# Patient Record
Sex: FEMALE
[De-identification: names, ages, dates, MRNs, and addresses within clinical notes are randomized per-mention and may not be internally consistent; named-entity substitution may affect disease eponyms.]

---

## 2021-04-05 ENCOUNTER — TRANSCRIPTION ENCOUNTER (OUTPATIENT)
Age: 31
End: 2021-04-05

## 2021-11-21 PROBLEM — Z00.00 ENCOUNTER FOR PREVENTIVE HEALTH EXAMINATION: Status: ACTIVE | Noted: 2021-11-21

## 2021-12-14 ENCOUNTER — ASOB RESULT (OUTPATIENT)
Age: 31
End: 2021-12-14

## 2021-12-14 ENCOUNTER — APPOINTMENT (OUTPATIENT)
Dept: ANTEPARTUM | Facility: CLINIC | Age: 31
End: 2021-12-14
Payer: COMMERCIAL

## 2021-12-14 PROCEDURE — 76811 OB US DETAILED SNGL FETUS: CPT

## 2022-02-23 ENCOUNTER — ASOB RESULT (OUTPATIENT)
Age: 32
End: 2022-02-23

## 2022-02-23 ENCOUNTER — APPOINTMENT (OUTPATIENT)
Dept: MATERNAL FETAL MEDICINE | Facility: CLINIC | Age: 32
End: 2022-02-23
Payer: COMMERCIAL

## 2022-02-23 ENCOUNTER — TRANSCRIPTION ENCOUNTER (OUTPATIENT)
Age: 32
End: 2022-02-23

## 2022-02-23 DIAGNOSIS — O24.419 GESTATIONAL DIABETES MELLITUS IN PREGNANCY, UNSPECIFIED CONTROL: ICD-10-CM

## 2022-02-23 PROCEDURE — G0109 DIAB MANAGE TRN IND/GROUP: CPT | Mod: 95

## 2022-02-23 RX ORDER — LANCETS 33 GAUGE
EACH MISCELLANEOUS
Qty: 4 | Refills: 2 | Status: ACTIVE | COMMUNITY
Start: 2022-02-23 | End: 1900-01-01

## 2022-02-23 RX ORDER — BLOOD-GLUCOSE METER
W/DEVICE KIT MISCELLANEOUS
Qty: 1 | Refills: 0 | Status: ACTIVE | COMMUNITY
Start: 2022-02-23 | End: 1900-01-01

## 2022-02-23 RX ORDER — URINE ACETONE TEST STRIPS
STRIP MISCELLANEOUS
Qty: 1 | Refills: 0 | Status: ACTIVE | COMMUNITY
Start: 2022-02-23 | End: 1900-01-01

## 2022-02-23 RX ORDER — BLOOD-GLUCOSE METER
KIT MISCELLANEOUS 4 TIMES DAILY
Qty: 4 | Refills: 1 | Status: ACTIVE | COMMUNITY
Start: 2022-02-23 | End: 1900-01-01

## 2022-03-08 ENCOUNTER — APPOINTMENT (OUTPATIENT)
Dept: MATERNAL FETAL MEDICINE | Facility: CLINIC | Age: 32
End: 2022-03-08
Payer: COMMERCIAL

## 2022-03-08 ENCOUNTER — ASOB RESULT (OUTPATIENT)
Age: 32
End: 2022-03-08

## 2022-03-08 PROCEDURE — G0108 DIAB MANAGE TRN  PER INDIV: CPT | Mod: 95

## 2022-03-09 ENCOUNTER — OUTPATIENT (OUTPATIENT)
Dept: OUTPATIENT SERVICES | Facility: HOSPITAL | Age: 32
LOS: 1 days | End: 2022-03-09
Payer: COMMERCIAL

## 2022-03-09 VITALS — HEART RATE: 72 BPM | SYSTOLIC BLOOD PRESSURE: 112 MMHG | DIASTOLIC BLOOD PRESSURE: 64 MMHG

## 2022-03-09 VITALS
RESPIRATION RATE: 18 BRPM | OXYGEN SATURATION: 87 % | HEART RATE: 115 BPM | DIASTOLIC BLOOD PRESSURE: 55 MMHG | TEMPERATURE: 99 F | SYSTOLIC BLOOD PRESSURE: 108 MMHG

## 2022-03-09 DIAGNOSIS — Z3A.00 WEEKS OF GESTATION OF PREGNANCY NOT SPECIFIED: ICD-10-CM

## 2022-03-09 DIAGNOSIS — O26.899 OTHER SPECIFIED PREGNANCY RELATED CONDITIONS, UNSPECIFIED TRIMESTER: ICD-10-CM

## 2022-03-09 LAB
APPEARANCE UR: CLEAR — SIGNIFICANT CHANGE UP
BILIRUB UR-MCNC: NEGATIVE — SIGNIFICANT CHANGE UP
COLOR SPEC: SIGNIFICANT CHANGE UP
DIFF PNL FLD: NEGATIVE — SIGNIFICANT CHANGE UP
GLUCOSE UR QL: NEGATIVE — SIGNIFICANT CHANGE UP
KETONES UR-MCNC: ABNORMAL
LEUKOCYTE ESTERASE UR-ACNC: NEGATIVE — SIGNIFICANT CHANGE UP
NITRITE UR-MCNC: NEGATIVE — SIGNIFICANT CHANGE UP
PH UR: 6.5 — SIGNIFICANT CHANGE UP (ref 5–8)
PROT UR-MCNC: NEGATIVE — SIGNIFICANT CHANGE UP
SP GR SPEC: 1.01 — SIGNIFICANT CHANGE UP (ref 1.01–1.02)
UROBILINOGEN FLD QL: NEGATIVE — SIGNIFICANT CHANGE UP

## 2022-03-09 PROCEDURE — 81003 URINALYSIS AUTO W/O SCOPE: CPT

## 2022-03-09 PROCEDURE — 59025 FETAL NON-STRESS TEST: CPT

## 2022-03-09 PROCEDURE — 87086 URINE CULTURE/COLONY COUNT: CPT

## 2022-03-09 PROCEDURE — G0463: CPT

## 2022-03-09 RX ORDER — SODIUM CHLORIDE 9 MG/ML
1000 INJECTION INTRAMUSCULAR; INTRAVENOUS; SUBCUTANEOUS ONCE
Refills: 0 | Status: COMPLETED | OUTPATIENT
Start: 2022-03-09 | End: 2022-03-09

## 2022-03-09 RX ADMIN — SODIUM CHLORIDE 1000 MILLILITER(S): 9 INJECTION INTRAMUSCULAR; INTRAVENOUS; SUBCUTANEOUS at 02:26

## 2022-03-09 RX ADMIN — Medication 0.25 MILLIGRAM(S): at 02:57

## 2022-03-09 RX ADMIN — Medication 0.25 MILLIGRAM(S): at 02:32

## 2022-03-09 NOTE — OB PROVIDER TRIAGE NOTE - NSOBPROVIDERNOTE_OBGYN_ALL_OB_FT
30yo  at 32.4wks presenting with PTC since 830pm. No evidence of PTL at this time as cervix is closed and uterine irritability on toco. Fetal status reassuring.     -Will send UA/Ucx to rule out UTI  -Terb 0.25mg q20min x2 doses for tocolysis   -IVF bolus  -Continue with EFM/Sonterra    D/w Dr. Rifkin RFrankel PGY3 32yo  at 32.4wks presenting with PTC since 830pm. No evidence of PTL at this time as cervix is closed and uterine irritability on toco. Fetal status reassuring.     -Will send UA/Ucx to rule out UTI  -Terb 0.25mg q20min x2 doses for tocolysis   -IVF bolus  -Continue with EFM/Simla    D/w Dr. Rifkin RFrankel PGY3    ----------  Patient reevaluated at bedside. UA negative. States she does not feel any contractions anymore after IVF and Terbutaline. Patient has follow up appointment with Dr. Khan today. Return precautions reviewed with patient and partner, including worsening contractions, LOF, VB, or dec FM. Patient expressed understanding.   Patient cleared for discharge.     D/w Dr. Rifkin RFrankel PGY3

## 2022-03-09 NOTE — OB PROVIDER TRIAGE NOTE - HISTORY OF PRESENT ILLNESS
32yo  at 32.4wk (KYLEE 2022) presenting with contractions since 830pm. Patient states she tried to lie down and walk around however contractions have intensified and increased in frequency to the point that now they are q1-2min apart. Endorses some associated low back. She denies VB, LOF, or dec FM. Denies nausea, vomiting, chest pain, SOB, dysuria, diarrhea, or discharge.     PNC: GDMA1  OBHx: current  GYNHx: Denies  PMH: Hypothyroidism   PSH: open appendectomy ()  Meds: Synthroid 50mcg qday, PNV, Calcium, Omega 3  All: NKDA  SocHx: Denies  FamHx: Father - DM

## 2022-03-09 NOTE — OB RN TRIAGE NOTE - FALL HARM RISK - UNIVERSAL INTERVENTIONS
Bed in lowest position, wheels locked, appropriate side rails in place/Call bell, personal items and telephone in reach/Instruct patient to call for assistance before getting out of bed or chair/Non-slip footwear when patient is out of bed/[address] to call system/Physically safe environment - no spills, clutter or unnecessary equipment/Purposeful Proactive Rounding/Room/bathroom lighting operational, light cord in reach

## 2022-03-09 NOTE — OB PROVIDER TRIAGE NOTE - NSHPPHYSICALEXAM_GEN_ALL_CORE
Vital Signs Last 24 Hrs  T(C): 37.1 (09 Mar 2022 01:28), Max: 37.1 (09 Mar 2022 01:28)  T(F): 98.8 (09 Mar 2022 01:28), Max: 98.8 (09 Mar 2022 01:28)  HR: 73 (09 Mar 2022 02:03) (67 - 92)  BP: 112/64 (09 Mar 2022 01:28) (112/64 - 112/64)  BP(mean): --  RR: 18 (09 Mar 2022 01:28) (18 - 18)  SpO2: 93% (09 Mar 2022 02:03) (85% - 100%)    Gen: resting comfortably, appears uncomfortable during cx episodes, last 10s  Abd: soft, NT, gravid  Back: no CVA tenderness bilat  SVE: 0/0/-3  TAUS: Vtx, post, EDWIN 13, 8/8  EFM: 140bpm, mod ana, +accels, -decels  [place]: uterine irritability

## 2022-03-10 LAB
CULTURE RESULTS: SIGNIFICANT CHANGE UP
SPECIMEN SOURCE: SIGNIFICANT CHANGE UP

## 2022-03-22 ENCOUNTER — APPOINTMENT (OUTPATIENT)
Dept: MATERNAL FETAL MEDICINE | Facility: CLINIC | Age: 32
End: 2022-03-22
Payer: COMMERCIAL

## 2022-03-22 ENCOUNTER — ASOB RESULT (OUTPATIENT)
Age: 32
End: 2022-03-22

## 2022-03-22 PROBLEM — E03.9 HYPOTHYROIDISM, UNSPECIFIED: Chronic | Status: ACTIVE | Noted: 2022-03-09

## 2022-03-22 PROBLEM — Z90.49 ACQUIRED ABSENCE OF OTHER SPECIFIED PARTS OF DIGESTIVE TRACT: Chronic | Status: ACTIVE | Noted: 2022-03-09

## 2022-03-22 PROCEDURE — G0108 DIAB MANAGE TRN  PER INDIV: CPT | Mod: 95

## 2022-04-05 ENCOUNTER — ASOB RESULT (OUTPATIENT)
Age: 32
End: 2022-04-05

## 2022-04-05 ENCOUNTER — APPOINTMENT (OUTPATIENT)
Dept: MATERNAL FETAL MEDICINE | Facility: CLINIC | Age: 32
End: 2022-04-05
Payer: COMMERCIAL

## 2022-04-05 PROCEDURE — G0108 DIAB MANAGE TRN  PER INDIV: CPT | Mod: 95

## 2022-05-01 ENCOUNTER — INPATIENT (INPATIENT)
Facility: HOSPITAL | Age: 32
LOS: 2 days | Discharge: HOME | End: 2022-05-04
Attending: STUDENT IN AN ORGANIZED HEALTH CARE EDUCATION/TRAINING PROGRAM | Admitting: STUDENT IN AN ORGANIZED HEALTH CARE EDUCATION/TRAINING PROGRAM
Payer: COMMERCIAL

## 2022-05-01 VITALS — OXYGEN SATURATION: 100 %

## 2022-05-01 DIAGNOSIS — Z34.80 ENCOUNTER FOR SUPERVISION OF OTHER NORMAL PREGNANCY, UNSPECIFIED TRIMESTER: ICD-10-CM

## 2022-05-01 DIAGNOSIS — Z3A.00 WEEKS OF GESTATION OF PREGNANCY NOT SPECIFIED: ICD-10-CM

## 2022-05-01 DIAGNOSIS — O26.899 OTHER SPECIFIED PREGNANCY RELATED CONDITIONS, UNSPECIFIED TRIMESTER: ICD-10-CM

## 2022-05-01 LAB
BASOPHILS # BLD AUTO: 0.05 K/UL — SIGNIFICANT CHANGE UP (ref 0–0.2)
BASOPHILS NFR BLD AUTO: 0.3 % — SIGNIFICANT CHANGE UP (ref 0–2)
BLD GP AB SCN SERPL QL: NEGATIVE — SIGNIFICANT CHANGE UP
COVID-19 SPIKE DOMAIN AB INTERP: POSITIVE
COVID-19 SPIKE DOMAIN ANTIBODY RESULT: >250 U/ML — HIGH
EOSINOPHIL # BLD AUTO: 0.06 K/UL — SIGNIFICANT CHANGE UP (ref 0–0.5)
EOSINOPHIL NFR BLD AUTO: 0.4 % — SIGNIFICANT CHANGE UP (ref 0–6)
GLUCOSE BLDC GLUCOMTR-MCNC: 100 MG/DL — HIGH (ref 70–99)
GLUCOSE BLDC GLUCOMTR-MCNC: 79 MG/DL — SIGNIFICANT CHANGE UP (ref 70–99)
GLUCOSE BLDC GLUCOMTR-MCNC: 97 MG/DL — SIGNIFICANT CHANGE UP (ref 70–99)
HCT VFR BLD CALC: 42.5 % — SIGNIFICANT CHANGE UP (ref 34.5–45)
HGB BLD-MCNC: 14.2 G/DL — SIGNIFICANT CHANGE UP (ref 11.5–15.5)
IMM GRANULOCYTES NFR BLD AUTO: 1.9 % — HIGH (ref 0–1.5)
LYMPHOCYTES # BLD AUTO: 14.4 % — SIGNIFICANT CHANGE UP (ref 13–44)
LYMPHOCYTES # BLD AUTO: 2.25 K/UL — SIGNIFICANT CHANGE UP (ref 1–3.3)
MCHC RBC-ENTMCNC: 29.6 PG — SIGNIFICANT CHANGE UP (ref 27–34)
MCHC RBC-ENTMCNC: 33.4 GM/DL — SIGNIFICANT CHANGE UP (ref 32–36)
MCV RBC AUTO: 88.5 FL — SIGNIFICANT CHANGE UP (ref 80–100)
MONOCYTES # BLD AUTO: 1.14 K/UL — HIGH (ref 0–0.9)
MONOCYTES NFR BLD AUTO: 7.3 % — SIGNIFICANT CHANGE UP (ref 2–14)
NEUTROPHILS # BLD AUTO: 11.84 K/UL — HIGH (ref 1.8–7.4)
NEUTROPHILS NFR BLD AUTO: 75.7 % — SIGNIFICANT CHANGE UP (ref 43–77)
NRBC # BLD: 0 /100 WBCS — SIGNIFICANT CHANGE UP (ref 0–0)
PLATELET # BLD AUTO: 166 K/UL — SIGNIFICANT CHANGE UP (ref 150–400)
RBC # BLD: 4.8 M/UL — SIGNIFICANT CHANGE UP (ref 3.8–5.2)
RBC # FLD: 14.6 % — HIGH (ref 10.3–14.5)
RH IG SCN BLD-IMP: POSITIVE — SIGNIFICANT CHANGE UP
RH IG SCN BLD-IMP: POSITIVE — SIGNIFICANT CHANGE UP
SARS-COV-2 IGG+IGM SERPL QL IA: >250 U/ML — HIGH
SARS-COV-2 IGG+IGM SERPL QL IA: POSITIVE
SARS-COV-2 RNA SPEC QL NAA+PROBE: SIGNIFICANT CHANGE UP
WBC # BLD: 15.63 K/UL — HIGH (ref 3.8–10.5)
WBC # FLD AUTO: 15.63 K/UL — HIGH (ref 3.8–10.5)

## 2022-05-01 RX ORDER — OXYTOCIN 10 UNIT/ML
333.33 VIAL (ML) INJECTION
Qty: 20 | Refills: 0 | Status: DISCONTINUED | OUTPATIENT
Start: 2022-05-01 | End: 2022-05-02

## 2022-05-01 RX ORDER — SODIUM CHLORIDE 9 MG/ML
1000 INJECTION, SOLUTION INTRAVENOUS
Refills: 0 | Status: DISCONTINUED | OUTPATIENT
Start: 2022-05-01 | End: 2022-05-02

## 2022-05-01 RX ORDER — OXYTOCIN 10 UNIT/ML
1 VIAL (ML) INJECTION
Qty: 30 | Refills: 0 | Status: DISCONTINUED | OUTPATIENT
Start: 2022-05-01 | End: 2022-05-04

## 2022-05-01 RX ORDER — SODIUM CHLORIDE 9 MG/ML
1000 INJECTION INTRAMUSCULAR; INTRAVENOUS; SUBCUTANEOUS
Refills: 0 | Status: DISCONTINUED | OUTPATIENT
Start: 2022-05-01 | End: 2022-05-02

## 2022-05-01 RX ORDER — OXYTOCIN 10 UNIT/ML
4 VIAL (ML) INJECTION
Qty: 30 | Refills: 0 | Status: DISCONTINUED | OUTPATIENT
Start: 2022-05-01 | End: 2022-05-01

## 2022-05-01 RX ORDER — SODIUM CHLORIDE 9 MG/ML
500 INJECTION INTRAMUSCULAR; INTRAVENOUS; SUBCUTANEOUS ONCE
Refills: 0 | Status: COMPLETED | OUTPATIENT
Start: 2022-05-01 | End: 2022-05-01

## 2022-05-01 RX ORDER — CITRIC ACID/SODIUM CITRATE 300-500 MG
15 SOLUTION, ORAL ORAL EVERY 6 HOURS
Refills: 0 | Status: DISCONTINUED | OUTPATIENT
Start: 2022-05-01 | End: 2022-05-02

## 2022-05-01 RX ORDER — SODIUM CHLORIDE 9 MG/ML
300 INJECTION INTRAMUSCULAR; INTRAVENOUS; SUBCUTANEOUS ONCE
Refills: 0 | Status: DISCONTINUED | OUTPATIENT
Start: 2022-05-01 | End: 2022-05-02

## 2022-05-01 RX ADMIN — Medication 4 MILLIUNIT(S)/MIN: at 19:04

## 2022-05-01 RX ADMIN — SODIUM CHLORIDE 1000 MILLILITER(S): 9 INJECTION INTRAMUSCULAR; INTRAVENOUS; SUBCUTANEOUS at 16:45

## 2022-05-01 RX ADMIN — SODIUM CHLORIDE 125 MILLILITER(S): 9 INJECTION, SOLUTION INTRAVENOUS at 09:09

## 2022-05-01 RX ADMIN — Medication 0.25 MILLIGRAM(S): at 16:45

## 2022-05-01 NOTE — OB PROVIDER H&P - ATTENDING COMMENTS
31y  @40w1d p/w contractions and admitted with newly diagnosed Oligo EDWIN 1.  GDMA1.  GBS neg    admit  labs   consents  for po cytotec and cook ballon  epidural prn    Yuly Khan  Ob attg

## 2022-05-01 NOTE — OB PROVIDER H&P - LIVING CHILDREN, OB PROFILE
YONAS MIRANDA   Fri May 5, 2017 10:20 AM  The following medication was given:     MEDICATION: Vitamin B12  1000mcg  ROUTE: IM  SITE: Deltoid - Right  DOSE: 1ml  LOT #: [id number]  :  [employer]  EXPIRATION DATE:  07/2018  NDC#: 0070-8266-46         Okayed by Dr. Madera to give 2 days early.       Yonas Miranda MA      0

## 2022-05-01 NOTE — OB PROVIDER H&P - HISTORY OF PRESENT ILLNESS
30y/o  at 40w1d presents with contractions Q5min since 1am. Pt reports 8/10 pain with CTX. Denies LOF, VB. +FM  PNC c/b GDMA1  EFW 3300  GBS Neg    OBHx: P0  GynHx: denies  PMHx: hypothyroid  PSHx: open appendectomy   Meds: PNV, synthroid  All: NKDA  SH: neg x3

## 2022-05-01 NOTE — OB PROVIDER IHI INDUCTION/AUGMENTATION NOTE - NS_CHECKALL_OBGYN_ALL_OB
Order was written/H&P was completed/Contractions pattern was reviewed/FHR was reviewed/Induction / Augmentation was discussed Have You Had Botox Before?: has had botox Additional History: Patient is afebrile at presentation and denies any symptoms consistent with Covid-19 infection. He/she has no sick contacts and has had no recent travel. When Was Your Last Botox Treatment?: 07/08/2020

## 2022-05-01 NOTE — OB PROVIDER H&P - NSICDXPASTMEDICALHX_GEN_ALL_CORE_FT
Pt complains of cough, shortness of breath, and chills x 3 days after being exposed to cousins that tested positive for covid. She lost taste and smell today. COVID  PUI   PAST MEDICAL HISTORY:  History of appendectomy 11 years ago    Hypothyroid on levothyroixine

## 2022-05-01 NOTE — OB PROVIDER TRIAGE NOTE - HISTORY OF PRESENT ILLNESS
32y/o  at 40w1d presents with contractions Q5min since 1am. Pt reports 8/10 pain with CTX. Denies LOF, VB. +FM  PNC c/b GDMA1  EFW 3300    OBHx: P0  GynHx: denies  PMHx: hypothyroid  PSHx: open appendectomy   Meds: PNV, synthroid  All: NKDA  SH: neg x3

## 2022-05-01 NOTE — OB PROVIDER H&P - NSHPPHYSICALEXAM_GEN_ALL_CORE
GA: NAD  Cards: RRR  Pulm: CTAB  Abd: soft, gravid, nontender  LE: nontender    VE: 0/50/-3  TAUS: vtx, MVP 5.0 GA: NAD  Cards: RRR  Pulm: CTAB  Abd: soft, gravid, nontender  LE: nontender    VE: 0/50/-3  TAUS: vtx, EDWIN 1.0

## 2022-05-01 NOTE — OB PROVIDER H&P - ASSESSMENT
31y  @40w1d p/w painful contractions and admitted with rrIOL with questionable decrease in EDWIN (MVP 5.0).  GBS neg.    Plan:  -admit to L&D  -routine labs  -IVF  -4BC  -epi/CB after doses of BC    d/w Dr. Bill Bhardwaj, PGY3 31y  @40w1d p/w painful contractions and admitted with newly diagnosed Oligo EDWIN 1.  GDMA1.  GBS neg.    Plan:  -admit to L&D  -routine labs  -IVF  -4BC  -epi/CB after doses of BC    d/w Dr. Bill Bhardwaj, PGY3

## 2022-05-01 NOTE — OB PROVIDER TRIAGE NOTE - NSHPPHYSICALEXAM_GEN_ALL_CORE
Vital Signs Last 24 Hrs  T(C): 36.9 (01 May 2022 05:09), Max: 36.9 (01 May 2022 05:09)  T(F): 98.4 (01 May 2022 05:09), Max: 98.4 (01 May 2022 05:09)  HR: 64 (01 May 2022 05:27) (64 - 88)  BP: 137/77 (01 May 2022 05:09) (137/77 - 137/77)  BP(mean): --  RR: 16 (01 May 2022 05:09) (16 - 16)  SpO2: 98% (01 May 2022 05:27) (98% - 100%)    GA: NAD  Cards: RRR  Pulm: CTAB  Abd: soft, gravid, nontender  LE: nontender    VE: 0/50/-3  TAUS: vtx, MVP 5.0    EFM: 120/mod/+accels/-decels  [place]: irregular

## 2022-05-01 NOTE — PRE-ANESTHESIA EVALUATION ADULT - NSANTHPMHFT_GEN_ALL_CORE
No cardiac or pulmonary disease  No bleeding or clotting disorders  GDM - diet controlled  Hypothyroid - medically managed

## 2022-05-01 NOTE — OB PROVIDER TRIAGE NOTE - NSOBPROVIDERNOTE_OBGYN_ALL_OB_FT
32y/o  at 40w1d presents for r/o labor, likely in early latent labor, VE closed on exam.   -NST->home  -labor precautions reviewed with pt and partner      D/w Dr. Bill lovett pgy4 32y/o  at 40w1d presents for r/o labor, likely in early latent labor, VE closed on exam.   -NST with indeterminate baseline with periods or nonreactivity  -Continue to monitor, will reeval tracing in 30min-1hr      D/w Dr. Bill lovett pgy4

## 2022-05-01 NOTE — PRE-ANESTHESIA EVALUATION ADULT - NSANTHOSAYNRD_GEN_A_CORE
No. BA screening performed.  STOP BANG Legend: 0-2 = LOW Risk; 3-4 = INTERMEDIATE Risk; 5-8 = HIGH Risk

## 2022-05-02 LAB
GLUCOSE BLDC GLUCOMTR-MCNC: 107 MG/DL — HIGH (ref 70–99)
GLUCOSE BLDC GLUCOMTR-MCNC: 92 MG/DL — SIGNIFICANT CHANGE UP (ref 70–99)
T PALLIDUM AB TITR SER: NEGATIVE — SIGNIFICANT CHANGE UP

## 2022-05-02 RX ORDER — POLYETHYLENE GLYCOL 3350 17 G/17G
17 POWDER, FOR SOLUTION ORAL DAILY
Refills: 0 | Status: DISCONTINUED | OUTPATIENT
Start: 2022-05-02 | End: 2022-05-04

## 2022-05-02 RX ORDER — HYDROCORTISONE 1 %
1 OINTMENT (GRAM) TOPICAL EVERY 6 HOURS
Refills: 0 | Status: DISCONTINUED | OUTPATIENT
Start: 2022-05-02 | End: 2022-05-04

## 2022-05-02 RX ORDER — KETOROLAC TROMETHAMINE 30 MG/ML
30 SYRINGE (ML) INJECTION ONCE
Refills: 0 | Status: DISCONTINUED | OUTPATIENT
Start: 2022-05-02 | End: 2022-05-02

## 2022-05-02 RX ORDER — OXYTOCIN 10 UNIT/ML
333.33 VIAL (ML) INJECTION
Qty: 20 | Refills: 0 | Status: COMPLETED | OUTPATIENT
Start: 2022-05-02 | End: 2022-05-02

## 2022-05-02 RX ORDER — SODIUM CHLORIDE 9 MG/ML
3 INJECTION INTRAMUSCULAR; INTRAVENOUS; SUBCUTANEOUS EVERY 8 HOURS
Refills: 0 | Status: DISCONTINUED | OUTPATIENT
Start: 2022-05-02 | End: 2022-05-04

## 2022-05-02 RX ORDER — SENNA PLUS 8.6 MG/1
2 TABLET ORAL AT BEDTIME
Refills: 0 | Status: DISCONTINUED | OUTPATIENT
Start: 2022-05-02 | End: 2022-05-04

## 2022-05-02 RX ORDER — DIBUCAINE 1 %
1 OINTMENT (GRAM) RECTAL EVERY 6 HOURS
Refills: 0 | Status: DISCONTINUED | OUTPATIENT
Start: 2022-05-02 | End: 2022-05-04

## 2022-05-02 RX ORDER — IBUPROFEN 200 MG
600 TABLET ORAL EVERY 6 HOURS
Refills: 0 | Status: COMPLETED | OUTPATIENT
Start: 2022-05-02 | End: 2023-03-31

## 2022-05-02 RX ORDER — IBUPROFEN 200 MG
600 TABLET ORAL EVERY 6 HOURS
Refills: 0 | Status: DISCONTINUED | OUTPATIENT
Start: 2022-05-02 | End: 2022-05-04

## 2022-05-02 RX ORDER — BENZOCAINE 10 %
1 GEL (GRAM) MUCOUS MEMBRANE EVERY 6 HOURS
Refills: 0 | Status: DISCONTINUED | OUTPATIENT
Start: 2022-05-02 | End: 2022-05-04

## 2022-05-02 RX ORDER — OXYCODONE HYDROCHLORIDE 5 MG/1
5 TABLET ORAL ONCE
Refills: 0 | Status: DISCONTINUED | OUTPATIENT
Start: 2022-05-02 | End: 2022-05-04

## 2022-05-02 RX ORDER — ACETAMINOPHEN 500 MG
975 TABLET ORAL
Refills: 0 | Status: DISCONTINUED | OUTPATIENT
Start: 2022-05-02 | End: 2022-05-04

## 2022-05-02 RX ORDER — LANOLIN
1 OINTMENT (GRAM) TOPICAL EVERY 6 HOURS
Refills: 0 | Status: DISCONTINUED | OUTPATIENT
Start: 2022-05-02 | End: 2022-05-04

## 2022-05-02 RX ORDER — DIPHENHYDRAMINE HCL 50 MG
25 CAPSULE ORAL EVERY 6 HOURS
Refills: 0 | Status: DISCONTINUED | OUTPATIENT
Start: 2022-05-02 | End: 2022-05-04

## 2022-05-02 RX ORDER — PRAMOXINE HYDROCHLORIDE 150 MG/15G
1 AEROSOL, FOAM RECTAL EVERY 4 HOURS
Refills: 0 | Status: DISCONTINUED | OUTPATIENT
Start: 2022-05-02 | End: 2022-05-04

## 2022-05-02 RX ORDER — AER TRAVELER 0.5 G/1
1 SOLUTION RECTAL; TOPICAL EVERY 4 HOURS
Refills: 0 | Status: DISCONTINUED | OUTPATIENT
Start: 2022-05-02 | End: 2022-05-04

## 2022-05-02 RX ORDER — OXYCODONE HYDROCHLORIDE 5 MG/1
5 TABLET ORAL
Refills: 0 | Status: DISCONTINUED | OUTPATIENT
Start: 2022-05-02 | End: 2022-05-04

## 2022-05-02 RX ORDER — TETANUS TOXOID, REDUCED DIPHTHERIA TOXOID AND ACELLULAR PERTUSSIS VACCINE, ADSORBED 5; 2.5; 8; 8; 2.5 [IU]/.5ML; [IU]/.5ML; UG/.5ML; UG/.5ML; UG/.5ML
0.5 SUSPENSION INTRAMUSCULAR ONCE
Refills: 0 | Status: DISCONTINUED | OUTPATIENT
Start: 2022-05-02 | End: 2022-05-04

## 2022-05-02 RX ORDER — SIMETHICONE 80 MG/1
80 TABLET, CHEWABLE ORAL EVERY 4 HOURS
Refills: 0 | Status: DISCONTINUED | OUTPATIENT
Start: 2022-05-02 | End: 2022-05-04

## 2022-05-02 RX ORDER — MAGNESIUM HYDROXIDE 400 MG/1
30 TABLET, CHEWABLE ORAL
Refills: 0 | Status: DISCONTINUED | OUTPATIENT
Start: 2022-05-02 | End: 2022-05-04

## 2022-05-02 RX ADMIN — Medication 30 MILLIGRAM(S): at 05:03

## 2022-05-02 RX ADMIN — OXYCODONE HYDROCHLORIDE 5 MILLIGRAM(S): 5 TABLET ORAL at 13:30

## 2022-05-02 RX ADMIN — Medication 1 TABLET(S): at 15:25

## 2022-05-02 RX ADMIN — Medication 600 MILLIGRAM(S): at 19:40

## 2022-05-02 RX ADMIN — Medication 975 MILLIGRAM(S): at 09:19

## 2022-05-02 RX ADMIN — Medication 1000 MILLIUNIT(S)/MIN: at 09:15

## 2022-05-02 RX ADMIN — Medication 975 MILLIGRAM(S): at 22:00

## 2022-05-02 RX ADMIN — Medication 975 MILLIGRAM(S): at 16:00

## 2022-05-02 RX ADMIN — Medication 975 MILLIGRAM(S): at 15:24

## 2022-05-02 RX ADMIN — OXYCODONE HYDROCHLORIDE 5 MILLIGRAM(S): 5 TABLET ORAL at 13:02

## 2022-05-02 RX ADMIN — Medication 1000 MILLIUNIT(S)/MIN: at 05:28

## 2022-05-02 RX ADMIN — Medication 600 MILLIGRAM(S): at 19:01

## 2022-05-02 RX ADMIN — SENNA PLUS 2 TABLET(S): 8.6 TABLET ORAL at 19:02

## 2022-05-02 RX ADMIN — Medication 975 MILLIGRAM(S): at 21:13

## 2022-05-02 NOTE — OB PROVIDER LABOR PROGRESS NOTE - NS_OBIHICONTRACTIONPATTERNDETAILS_OBGYN_ALL_OB_FT
ctx q 2-3 mins
ctx q 5 mins
q2-4 minutes
q2-3 minutes
ctx q 3-4 mins
tx q 3-5 mins
irregular w/ tetanic ctx
q2-3 minutes

## 2022-05-02 NOTE — OB PROVIDER LABOR PROGRESS NOTE - NS_OBIHIFHRDETAILS_OBGYN_ALL_OB_FT
145 bpm, moderate variability, late decelerations- resolved with maternal position change
155bpm, +accels, no decels
cat I
145/mod/+accels/+late decels Cat II
130/mod/+accels/occ variables  Cat II
120, mod, +accels, +7min decel
175 bpm, no accels, variable and late decels
145/mod/+accels/+variables Cat II

## 2022-05-02 NOTE — OB PROVIDER DELIVERY SUMMARY - NSPROVIDERDELIVERYNOTE_OBGYN_ALL_OB_FT
VAVD of viable infant after four pulls and 2 pop-offs of vacuum and a RML episiotomy was cut. Head, shoulders and body delivered easily in OP position. Placenta delivered intact. Vaginal exam revealed intact cervix, vaginal walls, sulci. A third degree laceration was identified and repaired in usual fashion with 2.0 vicryl, 2.0 vicryl rapide and 3.0 vicryl rapide suture. The rectum was examined and noted to be intact, without any suture. Bimanual exam performed, with minimal clot evacuated. Fundus and lower uterine segment firm. Excellent hemostasis. Count was correct x2.    Taylor, PGY-3

## 2022-05-02 NOTE — OB PROVIDER LABOR PROGRESS NOTE - NSVAGINALEXAM_OBGYN_ALL_OB_DT
01-May-2022 16:52
02-May-2022 02:45
01-May-2022 20:09
02-May-2022 00:58
01-May-2022 18:43
01-May-2022 15:04
01-May-2022 21:24

## 2022-05-02 NOTE — OB RN DELIVERY SUMMARY - NSSELHIDDEN_OBGYN_ALL_OB_FT
[NS_DeliveryAttending1_OBGYN_ALL_OB_FT:[id number]],[NS_DeliveryRN_OBGYN_ALL_OB_FT:[id number]] [NS_DeliveryAttending1_OBGYN_ALL_OB_FT:[id number]=],[NS_DeliveryRN_OBGYN_ALL_OB_FT:[id number]=],[NS_DeliveryAssist1_OBGYN_ALL_OB_FT:[id number]=]

## 2022-05-02 NOTE — OB RN DELIVERY SUMMARY - NS_SEPSISRSKCALC_OBGYN_ALL_OB_FT
EOS calculated successfully. EOS Risk Factor: 0.5/1000 live births ([facility] national incidence); GA=40w2d; Temp=99.68; ROM=9; GBS='Negative'; Antibiotics='No antibiotics or any antibiotics < 2 hrs prior to birth'

## 2022-05-02 NOTE — OB NEONATOLOGY/PEDIATRICIAN DELIVERY SUMMARY - NSPEDSNEONOTESA_OBGYN_ALL_OB_FT
40+1 wk SGA male born via VAVD for NRFHT to a 32 y/o  mother.  Maternal history of hypothyroidism on synthroid. Prenatal history of GDMA1 and oligohydramnios. Maternal labs include Blood Type A+ , HIV - , RPR NR , Rubella I , Hep B - , GBS - 22, COVID -. AROM at 1834 with clear fluids (ROM hours: 9). Baby emerged vigorous, crying, was w/d/s/s with APGARS of 9/9. Mom plans to initiate breastfeeding, declines Hep B vaccine and declines circ.  Highest maternal temp: 37.6. EOS 0.34.

## 2022-05-02 NOTE — OB PROVIDER LABOR PROGRESS NOTE - NS_SUBJECTIVE/OBJECTIVE_OBGYN_ALL_OB_FT
patient is feeling contractions
Pt comfortable with epidural  Denies rectal pressure  Pitocin at 2mu
Pt examined for progress. Denies rectal pressure/urge to push   Pitocin at 2mu
pt is comfortable
Pt pushing with contractions x 2 hours with poor maternal effort  Now refusing to push   Pitocin off
patient examined at bedside for prolonged decel
pt is comfortable
pt is comfortable with epidural

## 2022-05-02 NOTE — OB PROVIDER LABOR PROGRESS NOTE - ASSESSMENT
30yo  at 40.2 weeks  Category I Tracing  second stage of labor    Plan  - labor down for 30 minutes  - initiate pushing with maternal urge  - anticipate  
cook balloon in place  reexamine prn    Yuly Khan  ob attg
arom no fluid  discussed with pt will start pitocin.    Yuly Khan  OB attg
30 yo  at 40.1 weeks, IOL for oligo  s/p PO Cytotec/CB   Cat II tracing s/p Terb x 1    Plan   - continue pitocin augmentation  - re-assess in 2 hours or sooner for fetal indication
CB removed  Terb x1 given  Patient repositioned  FH returned to baseline    Dr. Khan aware  De Bhardwaj, PGY3
30 yo  at 40.2 weeks  pushing x 2 hours with minimal progress  Cat II Tracing    Plan  - epidural top off  - MD Pate aware of pt status  - pt counseled on R/B of VAVD vs PCS  - will resume pushing when pt comfortable   - CNM remains at bedside 
unchanged exam  pitocin held  discussed with pt if late decelerations persist will need primary  delivery    Yuly Khan  Ob attg
IUPC placed  will start amnioinfusion    Yuly Khan  Ob attg

## 2022-05-03 ENCOUNTER — TRANSCRIPTION ENCOUNTER (OUTPATIENT)
Age: 32
End: 2022-05-03

## 2022-05-03 DIAGNOSIS — Z37.9 OUTCOME OF DELIVERY, UNSPECIFIED: ICD-10-CM

## 2022-05-03 LAB
BASOPHILS # BLD AUTO: 0.04 K/UL — SIGNIFICANT CHANGE UP (ref 0–0.2)
BASOPHILS NFR BLD AUTO: 0.2 % — SIGNIFICANT CHANGE UP (ref 0–2)
EOSINOPHIL # BLD AUTO: 0.19 K/UL — SIGNIFICANT CHANGE UP (ref 0–0.5)
EOSINOPHIL NFR BLD AUTO: 1 % — SIGNIFICANT CHANGE UP (ref 0–6)
HCT VFR BLD CALC: 25.4 % — LOW (ref 34.5–45)
HGB BLD-MCNC: 8.4 G/DL — LOW (ref 11.5–15.5)
IMM GRANULOCYTES NFR BLD AUTO: 2 % — HIGH (ref 0–1.5)
LYMPHOCYTES # BLD AUTO: 12.9 % — LOW (ref 13–44)
LYMPHOCYTES # BLD AUTO: 2.53 K/UL — SIGNIFICANT CHANGE UP (ref 1–3.3)
MCHC RBC-ENTMCNC: 30 PG — SIGNIFICANT CHANGE UP (ref 27–34)
MCHC RBC-ENTMCNC: 33.1 GM/DL — SIGNIFICANT CHANGE UP (ref 32–36)
MCV RBC AUTO: 90.7 FL — SIGNIFICANT CHANGE UP (ref 80–100)
MONOCYTES # BLD AUTO: 0.78 K/UL — SIGNIFICANT CHANGE UP (ref 0–0.9)
MONOCYTES NFR BLD AUTO: 4 % — SIGNIFICANT CHANGE UP (ref 2–14)
NEUTROPHILS # BLD AUTO: 15.63 K/UL — HIGH (ref 1.8–7.4)
NEUTROPHILS NFR BLD AUTO: 79.9 % — HIGH (ref 43–77)
NRBC # BLD: 0 /100 WBCS — SIGNIFICANT CHANGE UP (ref 0–0)
PLATELET # BLD AUTO: 126 K/UL — LOW (ref 150–400)
RBC # BLD: 2.8 M/UL — LOW (ref 3.8–5.2)
RBC # FLD: 15 % — HIGH (ref 10.3–14.5)
WBC # BLD: 19.57 K/UL — HIGH (ref 3.8–10.5)
WBC # FLD AUTO: 19.57 K/UL — HIGH (ref 3.8–10.5)

## 2022-05-03 RX ORDER — LEVOTHYROXINE SODIUM 125 MCG
50 TABLET ORAL DAILY
Refills: 0 | Status: DISCONTINUED | OUTPATIENT
Start: 2022-05-03 | End: 2022-05-04

## 2022-05-03 RX ADMIN — Medication 600 MILLIGRAM(S): at 19:00

## 2022-05-03 RX ADMIN — Medication 600 MILLIGRAM(S): at 06:02

## 2022-05-03 RX ADMIN — Medication 600 MILLIGRAM(S): at 18:29

## 2022-05-03 RX ADMIN — Medication 975 MILLIGRAM(S): at 21:15

## 2022-05-03 RX ADMIN — Medication 975 MILLIGRAM(S): at 03:06

## 2022-05-03 RX ADMIN — Medication 600 MILLIGRAM(S): at 06:32

## 2022-05-03 RX ADMIN — Medication 600 MILLIGRAM(S): at 01:05

## 2022-05-03 RX ADMIN — Medication 600 MILLIGRAM(S): at 02:27

## 2022-05-03 RX ADMIN — Medication 975 MILLIGRAM(S): at 09:11

## 2022-05-03 RX ADMIN — Medication 975 MILLIGRAM(S): at 15:28

## 2022-05-03 RX ADMIN — Medication 600 MILLIGRAM(S): at 13:25

## 2022-05-03 RX ADMIN — Medication 1 TABLET(S): at 12:52

## 2022-05-03 RX ADMIN — Medication 975 MILLIGRAM(S): at 04:40

## 2022-05-03 RX ADMIN — Medication 975 MILLIGRAM(S): at 16:00

## 2022-05-03 RX ADMIN — Medication 975 MILLIGRAM(S): at 09:41

## 2022-05-03 RX ADMIN — Medication 975 MILLIGRAM(S): at 20:41

## 2022-05-03 RX ADMIN — Medication 600 MILLIGRAM(S): at 12:53

## 2022-05-03 NOTE — DISCHARGE NOTE OB - PATIENT PORTAL LINK FT
You can access the FollowMyHealth Patient Portal offered by [facility] by registering at the following website: [email or web address]. By joining [employer]’s FollowMyHealth portal, you will also be able to view your health information using other applications (apps) compatible with our system.

## 2022-05-03 NOTE — DISCHARGE NOTE OB - MEDICATION SUMMARY - MEDICATIONS TO TAKE
I will START or STAY ON the medications listed below when I get home from the hospital:    acetaminophen 325 mg oral tablet  -- 3 tab(s) by mouth every 6 hours  -- Indication: For pain    ibuprofen 600 mg oral tablet  -- 1 tab(s) by mouth every 6 hours  -- Indication: For pain    levothyroxine 50 mcg (0.05 mg) oral tablet  -- 1 tab(s) by mouth once a day  -- Indication: For thyroid

## 2022-05-03 NOTE — PROGRESS NOTE ADULT - ATTENDING COMMENTS
Patient seen and examined.  Agree with above.    desires d/c tomorrow  low residue diet  ambulate  po pain meds    Yuly Khan MD  OB attg

## 2022-05-03 NOTE — DISCHARGE NOTE OB - NS MD DC FALL RISK RISK
For information on Fall & Injury Prevention, visit: [email or web address] OR  [email or web address] OR  https://www.cdc.gov/steadi/patient.html

## 2022-05-03 NOTE — DISCHARGE NOTE OB - CARE PROVIDER_API CALL
Sally Pate)  [facility]etrics [facility]ne[facility]  [address] [place] [address], [address]  [place] [address]  Phone: [phone]  Fax: [phone]  Follow Up Time:

## 2022-05-03 NOTE — DISCHARGE NOTE OB - CARE PLAN
Principal Discharge DX:	Vacuum-assisted vaginal delivery  Assessment and plan of treatment:	reg diet, ambulating, pain control   1

## 2022-05-03 NOTE — CHART NOTE - NSCHARTNOTEFT_GEN_A_CORE
Patient seen for: nutrition consult for GDM postpartum education prior to discharge    Source: patient, EMR    Patient is a 32yo female PPD#1 s/p VAVD   Admission date: 5/1  Antepartum course significant for GDMA1  Pt plans on breastfeeding infant    Chart reviewed, events noted. Meds/labs reviewed.    Anthropometrics:  Height: 61 inches  Pre-pregnancy weight: 100 pounds   Weight gain: 23.8 pounds   Admit/Dosing wt: 123.8 pounds     Nutrition Diagnosis:   Food and Nutrition Related Knowledge Deficit related to knowledge of post-partum GDM nutrition recommendations as evidenced by PPD#1 s/p VAVD, first pregnancy, complicated by GDM.   Goal: Pt able to teach back 2 points of nutrition education provided.     Nutrition Intervention:  Post-partum GDM diet education provided to patient and spouse at bedside:   1) Increased risk of developing T2DM with GDM and ways to help prevent development  2) 4-12 week fasting oral glucose tolerance test follow-up as outpatient  3) General healthful diet and physical activity recommendations discussed  4) Increased energy needs with breastfeeding    After-delivery GDM education handout provided. Patient with questions regarding nutrition therapy for hemorrhoids/laceration post-partum, education provided. Patient and spouse made aware RD remains available.     Monitoring:  No other nutrition risks were identified during this encounter.  RD remains available upon request and will follow up per protocol.    Bibiana Ellis MS JUAN LAW [facility] Pager #[phone]

## 2022-05-04 VITALS
DIASTOLIC BLOOD PRESSURE: 74 MMHG | HEART RATE: 77 BPM | TEMPERATURE: 98 F | SYSTOLIC BLOOD PRESSURE: 107 MMHG | RESPIRATION RATE: 17 BRPM | OXYGEN SATURATION: 94 %

## 2022-05-04 PROCEDURE — G0463: CPT

## 2022-05-04 PROCEDURE — 86850 RBC ANTIBODY SCREEN: CPT

## 2022-05-04 PROCEDURE — 59025 FETAL NON-STRESS TEST: CPT

## 2022-05-04 PROCEDURE — 86901 BLOOD TYPING SEROLOGIC RH(D): CPT

## 2022-05-04 PROCEDURE — 82962 GLUCOSE BLOOD TEST: CPT

## 2022-05-04 PROCEDURE — 85025 COMPLETE CBC W/AUTO DIFF WBC: CPT

## 2022-05-04 PROCEDURE — 87635 SARS-COV-2 COVID-19 AMP PRB: CPT

## 2022-05-04 PROCEDURE — 86769 SARS-COV-2 COVID-19 ANTIBODY: CPT

## 2022-05-04 PROCEDURE — 86900 BLOOD TYPING SEROLOGIC ABO: CPT

## 2022-05-04 PROCEDURE — 86780 TREPONEMA PALLIDUM: CPT

## 2022-05-04 RX ORDER — LEVOTHYROXINE SODIUM 125 MCG
1 TABLET ORAL
Qty: 0 | Refills: 0 | DISCHARGE
Start: 2022-05-04

## 2022-05-04 RX ORDER — IBUPROFEN 200 MG
1 TABLET ORAL
Qty: 0 | Refills: 0 | DISCHARGE
Start: 2022-05-04

## 2022-05-04 RX ORDER — ACETAMINOPHEN 500 MG
3 TABLET ORAL
Qty: 0 | Refills: 0 | DISCHARGE
Start: 2022-05-04

## 2022-05-04 RX ADMIN — Medication 975 MILLIGRAM(S): at 09:18

## 2022-05-04 RX ADMIN — Medication 600 MILLIGRAM(S): at 00:45

## 2022-05-04 RX ADMIN — Medication 600 MILLIGRAM(S): at 11:55

## 2022-05-04 RX ADMIN — Medication 975 MILLIGRAM(S): at 03:11

## 2022-05-04 RX ADMIN — Medication 975 MILLIGRAM(S): at 09:48

## 2022-05-04 RX ADMIN — Medication 975 MILLIGRAM(S): at 03:45

## 2022-05-04 RX ADMIN — Medication 600 MILLIGRAM(S): at 12:33

## 2022-05-04 RX ADMIN — Medication 50 MICROGRAM(S): at 06:23

## 2022-05-04 RX ADMIN — Medication 600 MILLIGRAM(S): at 00:09

## 2022-05-04 RX ADMIN — Medication 1 TABLET(S): at 11:54

## 2022-05-04 NOTE — PROGRESS NOTE ADULT - PROBLEM SELECTOR PLAN 1
reg diet  ambulating  pain control    memo choi
Increase OOB  Regular diet  PO Pain protocol  Routine Postpartum Care

## 2022-05-04 NOTE — PROGRESS NOTE ADULT - SUBJECTIVE AND OBJECTIVE BOX
S: Patient is doing well without complaints. Tolerates regular diet. She states lochia is in WNL. Ambulating without difficulty. Denies N/V. Voiding freely. Passing flatus. Pain well controlled with oral pain medications. Denies any HA/vision changes, CP/SOB, F/C/S.    O: Vital Signs Last 24 Hrs  T(C): 36.8 (04 May 2022 05:00), Max: 36.8 (03 May 2022 16:48)  T(F): 98.3 (04 May 2022 05:00), Max: 98.3 (04 May 2022 05:00)  HR: 77 (04 May 2022 05:00) (77 - 85)  BP: 107/74 (04 May 2022 05:00) (101/67 - 107/74)  BP(mean): --  RR: 17 (04 May 2022 05:00) (17 - 17)  SpO2: 94% (04 May 2022 05:00) (94% - 99%)    Physical Exam:  General: NAD  Abdomen: soft, non-tender, non-distended, fundus firm  Vaginal: deferred  Ext: NTBL    Labs:             8.4    19.57 )-----------( 126      ( 05-03 @ 09:48 )             25.4                   MEDICATIONS  (STANDING):  acetaminophen     Tablet .. 975 milliGRAM(s) Oral <User Schedule>  diphtheria/tetanus/pertussis (acellular) Vaccine (ADAcel) 0.5 milliLiter(s) IntraMuscular once  ibuprofen  Tablet. 600 milliGRAM(s) Oral every 6 hours  levothyroxine 50 MICROGram(s) Oral daily  oxytocin Infusion. 1 milliUNIT(s)/Min (1 mL/Hr) IV Continuous <Continuous>  prenatal multivitamin 1 Tablet(s) Oral daily  senna 2 Tablet(s) Oral at bedtime  sodium chloride 0.9% lock flush 3 milliLiter(s) IV Push every 8 hours    
Postpartum Note- PPD#1    Prenatal Labs  Blood type: A Positive  Rubella IgG: Immune  RPR: Negative        S:Patient w/o complaints, pain is controlled.    Pt is OOB, tolerating PO, voiding. Lochia WNL.     O:  Vital Signs Last 24 Hrs  T(C): 36.8 (03 May 2022 05:00), Max: 37 (02 May 2022 21:01)  T(F): 98.2 (03 May 2022 05:00), Max: 98.6 (02 May 2022 21:01)  HR: 66 (03 May 2022 05:00) (66 - 100)  BP: 104/70 (03 May 2022 05:00) (91/60 - 111/75)  BP(mean): --  RR: 17 (03 May 2022 05:00) (17 - 18)  SpO2: 97% (03 May 2022 05:00) (97% - 100%)     Gen: NAD  Abdomen: Soft, nontender, non-distended, fundus firm.  Vaginal: Lochia WNL    Ext:  Neg calf tenderness    LABS:    Hemoglobin: 14.2 g/dL (05-01 @ 09:03)      Hematocrit: 42.5 % (05-01 @ 09:03)

## 2022-05-04 NOTE — PROGRESS NOTE ADULT - ASSESSMENT
A/P:  31y  PPD # 1   S/P  VAVD  with 3rd degree laceration     Doing Well    PMHx:  Current Issues: Breastfeeding concerns
s/p VAVD uncomplicated pp course

## 2022-06-30 NOTE — OB RN TRIAGE NOTE - SPO2 (%)
Prep Survey    Flowsheet Row Responses   [facility] facility patient discharged from? [facility]   Is LACE score < 7 ? No   Emergency Room discharge w/ pulse ox? No   Eligibility Readm Mgmt   Discharge diagnosis atypical chest pain,  ESRD   Does the patient have one of the following disease processes/diagnoses(primary or secondary)? Other   Does the patient have Home health ordered? No   Is there a DME ordered? No   General alerts for this patient James EDMONDS   Prep survey completed? Yes          MILES CHILDERS - Registered Nurse         99

## 2023-12-07 ENCOUNTER — APPOINTMENT (OUTPATIENT)
Dept: OBGYN | Facility: CLINIC | Age: 33
End: 2023-12-07
Payer: COMMERCIAL

## 2023-12-07 PROCEDURE — 99395 PREV VISIT EST AGE 18-39: CPT
